# Patient Record
Sex: FEMALE | Race: WHITE | NOT HISPANIC OR LATINO | URBAN - METROPOLITAN AREA
[De-identification: names, ages, dates, MRNs, and addresses within clinical notes are randomized per-mention and may not be internally consistent; named-entity substitution may affect disease eponyms.]

---

## 2020-07-25 ENCOUNTER — TELEPHONE ENCOUNTER (OUTPATIENT)
Dept: URBAN - METROPOLITAN AREA CLINIC 13 | Facility: CLINIC | Age: 75
End: 2020-07-25

## 2020-07-26 ENCOUNTER — TELEPHONE ENCOUNTER (OUTPATIENT)
Dept: URBAN - METROPOLITAN AREA CLINIC 13 | Facility: CLINIC | Age: 75
End: 2020-07-26

## 2020-07-26 RX ORDER — CETIRIZINE HYDROCHLORIDE 10 MG/1
TAKE 1 CAPSULE DAILY CAPSULE, LIQUID FILLED ORAL
Refills: 0 | Status: ACTIVE | COMMUNITY

## 2020-07-26 RX ORDER — RISEDRONATE SODIUM 129; 21 MG/1; MG/1
TAKE 1 TABLET  TAKE 1 TABLET MONTHLY TABLET, FILM COATED ORAL
Refills: 0 | Status: ACTIVE | COMMUNITY

## 2020-07-26 RX ORDER — CALCIUM CITRATE/VITAMIN D3 315MG-6.25
TAKE 1 TABLET DAILY PT STATES CALCIUM IS 630MG AND VIT. D IS 500 IU TABLET ORAL
Refills: 0 | Status: ACTIVE | COMMUNITY
Start: 2011-05-18

## 2020-07-26 RX ORDER — OLOPATADINE HYDROCHLORIDE 600 UG/1
INSTILL 1 SQUIRT TWICE DAILY SPRAY, METERED NASAL
Refills: 0 | Status: ACTIVE | COMMUNITY

## 2020-10-09 ENCOUNTER — TELEPHONE ENCOUNTER (OUTPATIENT)
Dept: URBAN - METROPOLITAN AREA CLINIC 113 | Facility: CLINIC | Age: 75
End: 2020-10-09

## 2021-06-23 ENCOUNTER — OFFICE VISIT (OUTPATIENT)
Dept: URBAN - METROPOLITAN AREA CLINIC 113 | Facility: CLINIC | Age: 76
End: 2021-06-23
Payer: MEDICARE

## 2021-06-23 ENCOUNTER — WEB ENCOUNTER (OUTPATIENT)
Dept: URBAN - METROPOLITAN AREA CLINIC 113 | Facility: CLINIC | Age: 76
End: 2021-06-23

## 2021-06-23 VITALS
WEIGHT: 152 LBS | RESPIRATION RATE: 20 BRPM | SYSTOLIC BLOOD PRESSURE: 147 MMHG | TEMPERATURE: 97.3 F | HEIGHT: 60 IN | BODY MASS INDEX: 29.84 KG/M2 | DIASTOLIC BLOOD PRESSURE: 80 MMHG | HEART RATE: 64 BPM

## 2021-06-23 DIAGNOSIS — R93.3 ABNORMAL FINDINGS ON DIAGNOSTIC IMAGING OF OTHER PARTS OF DIGESTIVE TRACT: ICD-10-CM

## 2021-06-23 DIAGNOSIS — K86.89 FATTY PANCREAS: ICD-10-CM

## 2021-06-23 PROCEDURE — 99203 OFFICE O/P NEW LOW 30 MIN: CPT | Performed by: INTERNAL MEDICINE

## 2021-06-23 RX ORDER — LOSARTAN POTASSIUM 100 MG/1
1 TABLET TABLET ORAL ONCE A DAY
Status: ACTIVE | COMMUNITY

## 2021-06-23 RX ORDER — AMLODIPINE BESYLATE 2.5 MG
1 TABLET TABLET ORAL ONCE A DAY
Status: ACTIVE | COMMUNITY

## 2021-06-23 RX ORDER — CALCIUM CITRATE/VITAMIN D3 315MG-6.25
TAKE 1 TABLET DAILY PT STATES CALCIUM IS 630MG AND VIT. D IS 500 IU TABLET ORAL
Refills: 0 | Status: ACTIVE | COMMUNITY
Start: 2011-05-18

## 2021-06-23 RX ORDER — DENOSUMAB 60 MG/ML
AS DIRECTED INJECTION SUBCUTANEOUS
Status: ACTIVE | COMMUNITY

## 2021-06-23 RX ORDER — OLOPATADINE HYDROCHLORIDE 600 UG/1
INSTILL 1 SQUIRT TWICE DAILY SPRAY, METERED NASAL
Refills: 0 | Status: ON HOLD | COMMUNITY

## 2021-06-23 RX ORDER — CETIRIZINE HYDROCHLORIDE 10 MG/1
TAKE 1 CAPSULE DAILY CAPSULE, LIQUID FILLED ORAL
Refills: 0 | Status: ACTIVE | COMMUNITY

## 2021-06-23 RX ORDER — RISEDRONATE SODIUM 129; 21 MG/1; MG/1
TAKE 1 TABLET  TAKE 1 TABLET MONTHLY TABLET, FILM COATED ORAL
Refills: 0 | Status: ON HOLD | COMMUNITY

## 2021-06-23 NOTE — HPI-TODAY'S VISIT:
Patient presents today for evaluation.  She is not been seen in many years.  She states that she has an ultrasound done yearly with her PCP given some hepatic cyst.  She was found to have renal cysts and subsequent CT with contrast done at [facility] apparently showed fatty pancreas.  She denies any abdominal pain, nausea or vomiting.  She denies any jaundice or dark urine.  There is no family history of liver disease, pancreatic disease or colon cancer.  Her last colonoscopy was in 2016 a 10-year interval recommendation was made at that time.  She has no other complaints today.  She does report she has been gaining weight.  He attributes this to the COVID-19 pandemic

## 2021-06-24 LAB
A/G RATIO: 1.8
ALBUMIN: 4.2
ALKALINE PHOSPHATASE: 70
ALT (SGPT): 14
AST (SGOT): 17
BASO (ABSOLUTE): 0
BASOS: 1
BILIRUBIN, TOTAL: 0.3
BUN/CREATININE RATIO: 25
BUN: 18
CA 19-9: 12
CALCIUM: 9.2
CARBON DIOXIDE, TOTAL: 25
CHLORIDE: 106
CREATININE: 0.71
EGFR IF AFRICN AM: 97
EGFR IF NONAFRICN AM: 84
EOS (ABSOLUTE): 0.2
EOS: 3
GLOBULIN, TOTAL: 2.4
GLUCOSE: 84
HEMATOCRIT: 35.3
HEMATOLOGY COMMENTS:: (no result)
HEMOGLOBIN: 12.3
IMMATURE CELLS: (no result)
IMMATURE GRANS (ABS): 0
IMMATURE GRANULOCYTES: 0
LYMPHS (ABSOLUTE): 2
LYMPHS: 27
MCH: 29.7
MCHC: 34.8
MCV: 85
MONOCYTES(ABSOLUTE): 0.5
MONOCYTES: 6
NEUTROPHILS (ABSOLUTE): 4.8
NEUTROPHILS: 63
NRBC: (no result)
PLATELETS: 262
POTASSIUM: 4.3
PROTEIN, TOTAL: 6.6
RBC: 4.14
RDW: 13
SODIUM: 142
WBC: 7.5

## 2021-08-20 ENCOUNTER — OFFICE VISIT (OUTPATIENT)
Dept: URBAN - METROPOLITAN AREA CLINIC 113 | Facility: CLINIC | Age: 76
End: 2021-08-20

## 2022-12-06 ENCOUNTER — DASHBOARD ENCOUNTERS (OUTPATIENT)
Age: 77
End: 2022-12-06

## 2022-12-06 ENCOUNTER — OFFICE VISIT (OUTPATIENT)
Dept: URBAN - METROPOLITAN AREA CLINIC 113 | Facility: CLINIC | Age: 77
End: 2022-12-06
Payer: MEDICARE

## 2022-12-06 VITALS
HEART RATE: 69 BPM | RESPIRATION RATE: 18 BRPM | DIASTOLIC BLOOD PRESSURE: 77 MMHG | BODY MASS INDEX: 29.64 KG/M2 | HEIGHT: 60 IN | SYSTOLIC BLOOD PRESSURE: 151 MMHG | WEIGHT: 151 LBS | TEMPERATURE: 97.1 F

## 2022-12-06 DIAGNOSIS — K86.89 FATTY PANCREAS: ICD-10-CM

## 2022-12-06 DIAGNOSIS — K21.9 GERD WITHOUT ESOPHAGITIS: ICD-10-CM

## 2022-12-06 DIAGNOSIS — R93.3 ABNORMAL FINDINGS ON DIAGNOSTIC IMAGING OF OTHER PARTS OF DIGESTIVE TRACT: ICD-10-CM

## 2022-12-06 DIAGNOSIS — K76.89 LIVER CYST: ICD-10-CM

## 2022-12-06 PROBLEM — 85057007: Status: ACTIVE | Noted: 2022-12-06

## 2022-12-06 PROBLEM — 266435005: Status: ACTIVE | Noted: 2022-12-06

## 2022-12-06 PROCEDURE — 99214 OFFICE O/P EST MOD 30 MIN: CPT | Performed by: INTERNAL MEDICINE

## 2022-12-06 RX ORDER — OLOPATADINE HYDROCHLORIDE 600 UG/1
INSTILL 1 SQUIRT TWICE DAILY SPRAY, METERED NASAL
Refills: 0 | Status: ON HOLD | COMMUNITY

## 2022-12-06 RX ORDER — LOSARTAN POTASSIUM 100 MG/1
1 TABLET TABLET ORAL ONCE A DAY
Status: ACTIVE | COMMUNITY

## 2022-12-06 RX ORDER — DENOSUMAB 60 MG/ML
AS DIRECTED INJECTION SUBCUTANEOUS
Status: ACTIVE | COMMUNITY

## 2022-12-06 RX ORDER — AMLODIPINE BESYLATE 2.5 MG
1 TABLET TABLET ORAL ONCE A DAY
Status: ACTIVE | COMMUNITY

## 2022-12-06 RX ORDER — CETIRIZINE HYDROCHLORIDE 10 MG/1
TAKE 1 CAPSULE DAILY CAPSULE, LIQUID FILLED ORAL
Refills: 0 | Status: ACTIVE | COMMUNITY

## 2022-12-06 RX ORDER — CALCIUM CITRATE/VITAMIN D3 315MG-6.25
TAKE 1 TABLET DAILY PT STATES CALCIUM IS 630MG AND VIT. D IS 500 IU TABLET ORAL
Refills: 0 | Status: ON HOLD | COMMUNITY
Start: 2011-05-18

## 2022-12-06 RX ORDER — RISEDRONATE SODIUM 129; 21 MG/1; MG/1
TAKE 1 TABLET  TAKE 1 TABLET MONTHLY TABLET, FILM COATED ORAL
Refills: 0 | Status: ON HOLD | COMMUNITY

## 2022-12-06 NOTE — HPI-TODAY'S VISIT:
Patient returns today in follow-up.  She has a CT scan done recently for history of liver cyst.  This is reviewed.  Showed multiple liver cysts ranging up to 39 millimeters.  Question of hemangioma.  No concerning lesions nor evidence of nodularity of the liver noted.  She also has a fatty replacement of the pancreas.  She denies any active GI complaints.  No abdominal pain, jaundice or dark urine.  She reports no history of abnormal liver test.  She does get blood work with Dr. White every year.  She states that she has been generally having a CT scan every few years to follow-up with these imaging.  She is up-to-date with colon cancer screening.  She does describe some reflux symptoms on occasion.  She has known history of a small hiatal hernia as well as a widely patent Schatzki ring.  She denies any dysphagia.